# Patient Record
Sex: FEMALE | Race: OTHER | NOT HISPANIC OR LATINO | URBAN - METROPOLITAN AREA
[De-identification: names, ages, dates, MRNs, and addresses within clinical notes are randomized per-mention and may not be internally consistent; named-entity substitution may affect disease eponyms.]

---

## 2021-04-29 ENCOUNTER — EMERGENCY (EMERGENCY)
Facility: HOSPITAL | Age: 56
LOS: 1 days | Discharge: HOME | End: 2021-04-29
Attending: EMERGENCY MEDICINE
Payer: COMMERCIAL

## 2021-04-29 VITALS
HEART RATE: 74 BPM | HEIGHT: 71 IN | SYSTOLIC BLOOD PRESSURE: 132 MMHG | WEIGHT: 160.06 LBS | RESPIRATION RATE: 16 BRPM | DIASTOLIC BLOOD PRESSURE: 84 MMHG | OXYGEN SATURATION: 97 %

## 2021-04-29 VITALS
RESPIRATION RATE: 16 BRPM | OXYGEN SATURATION: 95 % | DIASTOLIC BLOOD PRESSURE: 87 MMHG | HEART RATE: 70 BPM | SYSTOLIC BLOOD PRESSURE: 127 MMHG

## 2021-04-29 LAB
ALBUMIN SERPL ELPH-MCNC: 4.2 G/DL — SIGNIFICANT CHANGE UP (ref 3.3–5)
ALP SERPL-CCNC: 99 U/L — SIGNIFICANT CHANGE UP (ref 40–120)
ALT FLD-CCNC: 11 U/L — SIGNIFICANT CHANGE UP (ref 10–45)
ANION GAP SERPL CALC-SCNC: 11 MMOL/L — SIGNIFICANT CHANGE UP (ref 5–17)
AST SERPL-CCNC: 16 U/L — SIGNIFICANT CHANGE UP (ref 10–40)
BASOPHILS # BLD AUTO: 0.06 K/UL — SIGNIFICANT CHANGE UP (ref 0–0.2)
BASOPHILS NFR BLD AUTO: 0.6 % — SIGNIFICANT CHANGE UP (ref 0–2)
BILIRUB SERPL-MCNC: 0.2 MG/DL — SIGNIFICANT CHANGE UP (ref 0.2–1.2)
BUN SERPL-MCNC: 15 MG/DL — SIGNIFICANT CHANGE UP (ref 7–23)
CALCIUM SERPL-MCNC: 9.4 MG/DL — SIGNIFICANT CHANGE UP (ref 8.4–10.5)
CHLORIDE SERPL-SCNC: 104 MMOL/L — SIGNIFICANT CHANGE UP (ref 96–108)
CO2 SERPL-SCNC: 23 MMOL/L — SIGNIFICANT CHANGE UP (ref 22–31)
CREAT SERPL-MCNC: 0.74 MG/DL — SIGNIFICANT CHANGE UP (ref 0.5–1.3)
EOSINOPHIL # BLD AUTO: 0.09 K/UL — SIGNIFICANT CHANGE UP (ref 0–0.5)
EOSINOPHIL NFR BLD AUTO: 1 % — SIGNIFICANT CHANGE UP (ref 0–6)
GLUCOSE SERPL-MCNC: 114 MG/DL — HIGH (ref 70–99)
HCT VFR BLD CALC: 40.1 % — SIGNIFICANT CHANGE UP (ref 34.5–45)
HGB BLD-MCNC: 13.3 G/DL — SIGNIFICANT CHANGE UP (ref 11.5–15.5)
IMM GRANULOCYTES NFR BLD AUTO: 0.3 % — SIGNIFICANT CHANGE UP (ref 0–1.5)
LYMPHOCYTES # BLD AUTO: 1.13 K/UL — SIGNIFICANT CHANGE UP (ref 1–3.3)
LYMPHOCYTES # BLD AUTO: 12 % — LOW (ref 13–44)
MAGNESIUM SERPL-MCNC: 2.4 MG/DL — SIGNIFICANT CHANGE UP (ref 1.6–2.6)
MCHC RBC-ENTMCNC: 29.2 PG — SIGNIFICANT CHANGE UP (ref 27–34)
MCHC RBC-ENTMCNC: 33.2 GM/DL — SIGNIFICANT CHANGE UP (ref 32–36)
MCV RBC AUTO: 88.1 FL — SIGNIFICANT CHANGE UP (ref 80–100)
MONOCYTES # BLD AUTO: 0.62 K/UL — SIGNIFICANT CHANGE UP (ref 0–0.9)
MONOCYTES NFR BLD AUTO: 6.6 % — SIGNIFICANT CHANGE UP (ref 2–14)
NEUTROPHILS # BLD AUTO: 7.49 K/UL — HIGH (ref 1.8–7.4)
NEUTROPHILS NFR BLD AUTO: 79.5 % — HIGH (ref 43–77)
NRBC # BLD: 0 /100 WBCS — SIGNIFICANT CHANGE UP (ref 0–0)
NT-PROBNP SERPL-SCNC: 18 PG/ML — SIGNIFICANT CHANGE UP (ref 0–300)
PLATELET # BLD AUTO: 238 K/UL — SIGNIFICANT CHANGE UP (ref 150–400)
POTASSIUM SERPL-MCNC: 3.4 MMOL/L — LOW (ref 3.5–5.3)
POTASSIUM SERPL-SCNC: 3.4 MMOL/L — LOW (ref 3.5–5.3)
PROT SERPL-MCNC: 7.4 G/DL — SIGNIFICANT CHANGE UP (ref 6–8.3)
RBC # BLD: 4.55 M/UL — SIGNIFICANT CHANGE UP (ref 3.8–5.2)
RBC # FLD: 12.8 % — SIGNIFICANT CHANGE UP (ref 10.3–14.5)
SODIUM SERPL-SCNC: 138 MMOL/L — SIGNIFICANT CHANGE UP (ref 135–145)
TROPONIN T, HIGH SENSITIVITY RESULT: <6 NG/L — SIGNIFICANT CHANGE UP (ref 0–51)
WBC # BLD: 9.42 K/UL — SIGNIFICANT CHANGE UP (ref 3.8–10.5)
WBC # FLD AUTO: 9.42 K/UL — SIGNIFICANT CHANGE UP (ref 3.8–10.5)

## 2021-04-29 PROCEDURE — 93005 ELECTROCARDIOGRAM TRACING: CPT

## 2021-04-29 PROCEDURE — 84484 ASSAY OF TROPONIN QUANT: CPT

## 2021-04-29 PROCEDURE — 99283 EMERGENCY DEPT VISIT LOW MDM: CPT | Mod: 25

## 2021-04-29 PROCEDURE — 80053 COMPREHEN METABOLIC PANEL: CPT

## 2021-04-29 PROCEDURE — 71046 X-RAY EXAM CHEST 2 VIEWS: CPT | Mod: 26

## 2021-04-29 PROCEDURE — 71046 X-RAY EXAM CHEST 2 VIEWS: CPT

## 2021-04-29 PROCEDURE — 83880 ASSAY OF NATRIURETIC PEPTIDE: CPT

## 2021-04-29 PROCEDURE — 83735 ASSAY OF MAGNESIUM: CPT

## 2021-04-29 PROCEDURE — 99285 EMERGENCY DEPT VISIT HI MDM: CPT

## 2021-04-29 PROCEDURE — 85025 COMPLETE CBC W/AUTO DIFF WBC: CPT

## 2021-04-29 NOTE — ED PROVIDER NOTE - NSFOLLOWUPINSTRUCTIONS_ED_ALL_ED_FT
You were seen in the Emergency Department for chest pain.    Follow up with your primary care provider within a week.     If you have chest pain, shortness of breath, weakness, feel that you're going to pass out, have any other symptoms, develop fever, chills, nausea, vomiting, new or worsening pain, or if you have any new symptoms return to the Emergency Department.

## 2021-04-29 NOTE — ED PROVIDER NOTE - CLINICAL SUMMARY MEDICAL DECISION MAKING FREE TEXT BOX
56F pmh anxiety depression presents to ED after chest pain arm numbness feeling short of breath after boarding plane, sxs similar to prior panic attack, vitals wnl, low suspicion acs, likely anxiety related, not suspecting PE, not likely MSK pain, r/o acs w/ trop + ekg, labs, dc home w/ followup outpt 56F pmh anxiety depression presents to ED after chest pain arm numbness feeling short of breath after boarding plane, sxs similar to prior panic attack, vitals wnl, low suspicion acs, likely anxiety related, not suspecting PE, not likely MSK pain, r/o acs w/ trop + ekg, labs, dc home w/ followup outpt    Kym: 56 year old female with anxiety, depression, presents to the ER with chest pain, arm numbness and feeling short of breath after boarding the plane for first time. has been very anxious and very nervous about flying with mask. symptoms improved at this time. will get labs, ekg, reassess. likely outpatient f/u.

## 2021-04-29 NOTE — ED PROVIDER NOTE - PHYSICAL EXAMINATION
Supa Mac (DO)     GEN APPEARANCE: Well appearing adult F, NAD  HEAD: Atraumatic, normocephalic   EYES: PERRLa  CV: RRR, S1S2,   LUNGS: CTAB. No wheezing. No rales. No rhonchi. No diminished breath sounds.   ABDOMEN: Soft, NTND. No guarding or rebound. No masses.   NEURO: Alert & oriented, no focal deficits  SKIN: Normal color for race, warm, dry and intact. No evidence of rash. Supa Mac (DO)     GEN APPEARANCE: Well appearing adult F, NAD  HEAD: Atraumatic, normocephalic   EYES: PERRLA  CV: RRR, S1S2,   LUNGS: CTAB. No wheezing. No rales. No rhonchi. No diminished breath sounds.   ABDOMEN: Soft, NTND. No guarding or rebound. No masses.   NEURO: Alert & oriented, no focal deficits  SKIN: Normal color for race, warm, dry and intact. No evidence of rash.

## 2021-04-29 NOTE — ED ADULT NURSE NOTE - OBJECTIVE STATEMENT
Pt is a 57y/o female A&Ox3 speaking coherently ambulating independently BIBEMS w/ c/o anxiety at [facility] before boarding a plane bound for [place]. Pt has a hx of anxiety (on Lexapro and has Ambien in bag for plane ride, denies any other medical hx) and states that the thought of having to wear a mask for the duration of the flight made her anxious. Started to feel tingling in her fingers and on EMS arrival, had c/o chest pain. On arrival to ED, pt not complaining of any chest pain or discomfort. Currently has no complaints and does not feel anxious anymore. Covid vaccinated x2. Denies headache, vision changes, shortness of breath, abdominal pain, nausea, vomiting, diarrhea, fevers, chills, dysuria, hematuria, recent illness travel or fall. Safety and comfort measures provided. Bed locked and in lowest position, side rails up for safety. Call bell within reach. Pt is a 55y/o female A&Ox3 speaking coherently ambulating independently BIBEMS w/ c/o anxiety & chest pain at [facility] while attempting to board a plane bound for [place]. Pt has a hx of anxiety & depression (on Lexapro and has Ambien in bag for plane ride, denies any other medical hx) and states that the thought of having to wear a mask for the duration of the flight made her anxious. Started to feel tingling in her fingers and on EMS arrival, had c/o chest pain. On arrival to ED, pt not complaining of any chest pain or discomfort. Currently has no complaints and does not feel anxious anymore. Covid vaccinated x2. Denies headache, vision changes, shortness of breath, abdominal pain, nausea, vomiting, diarrhea, fevers, chills, dysuria, hematuria, recent illness travel or fall. Safety and comfort measures provided. Bed locked and in lowest position, side rails up for safety. Call bell within reach.

## 2021-04-29 NOTE — ED PROVIDER NOTE - PATIENT PORTAL LINK FT
You can access the FollowMyHealth Patient Portal offered by [facility] by registering at the following website: [email or web address]. By joining [employer]’s FollowMyHealth portal, you will also be able to view your health information using other applications (apps) compatible with our system.

## 2021-04-29 NOTE — ED PROVIDER NOTE - OBJECTIVE STATEMENT
56y F with PMHx of Depression, Anxiety, presents to the ED BIBEMS c/o chest discomfort sudden onset when attempting to board a plan to [place] from [facility]. Pt left the plane prior to take off as she was too nervous. Pt reported SOB, chest tightness, and tingling of the hands. Reports she is feeling better in ED, currently 5/10. Denies recent sickness. Notes increased anxiety within the last week. PT states she had a panic attack in the past the felt similar to today. Denies n/v, abd pain, urinary or fecal issues. Pt recently restarted medication.    ID: [id number]

## 2021-04-29 NOTE — ED ADULT TRIAGE NOTE - BP NONINVASIVE SYSTOLIC (MM HG)
132 Body Location Override (Optional - Billing Will Still Be Based On Selected Body Map Location If Applicable): right temple/ lateral canthus junction

## 2021-10-17 NOTE — ED PROVIDER NOTE - NS_RESIDENTSCRIBE_ED_ALL_ED
verbal cues/nonverbal cues (demo/gestures)/1 person assist
Patient/Caregiver provided printed discharge information.
I personally performed the service described in the documentation recorded by the scribe in my presence, and it accurately and completely records my words and actions.